# Patient Record
Sex: FEMALE | Race: WHITE | NOT HISPANIC OR LATINO | Employment: FULL TIME | URBAN - NONMETROPOLITAN AREA
[De-identification: names, ages, dates, MRNs, and addresses within clinical notes are randomized per-mention and may not be internally consistent; named-entity substitution may affect disease eponyms.]

---

## 2023-09-21 ENCOUNTER — HOSPITAL ENCOUNTER (EMERGENCY)
Facility: HOSPITAL | Age: 38
Discharge: HOME | End: 2023-09-21
Attending: FAMILY MEDICINE | Admitting: FAMILY MEDICINE

## 2023-09-21 VITALS
OXYGEN SATURATION: 99 % | SYSTOLIC BLOOD PRESSURE: 128 MMHG | DIASTOLIC BLOOD PRESSURE: 75 MMHG | BODY MASS INDEX: 34.15 KG/M2 | HEIGHT: 64 IN | RESPIRATION RATE: 18 BRPM | TEMPERATURE: 98.5 F | HEART RATE: 108 BPM | WEIGHT: 200 LBS

## 2023-09-21 DIAGNOSIS — F10.920 ALCOHOLIC INTOXICATION WITHOUT COMPLICATION: Primary | ICD-10-CM

## 2023-09-21 DIAGNOSIS — D50.8 OTHER IRON DEFICIENCY ANEMIA: ICD-10-CM

## 2023-09-21 LAB
ALBUMIN SERPL-MCNC: 3.9 G/DL (ref 3.5–5.2)
ALBUMIN/GLOB SERPL: 1.2 G/DL
ALP SERPL-CCNC: 77 U/L (ref 39–117)
ALT SERPL W P-5'-P-CCNC: 7 U/L (ref 1–33)
AMPHET+METHAMPHET UR QL: NEGATIVE
AMPHETAMINES UR QL: NEGATIVE
ANION GAP SERPL CALCULATED.3IONS-SCNC: 12 MMOL/L (ref 5–15)
ANISOCYTOSIS BLD QL: ABNORMAL
APAP SERPL-MCNC: <5 MCG/ML (ref 0–30)
AST SERPL-CCNC: 20 U/L (ref 1–32)
B-HCG UR QL: NEGATIVE
BARBITURATES UR QL SCN: NEGATIVE
BENZODIAZ UR QL SCN: NEGATIVE
BILIRUB SERPL-MCNC: <0.2 MG/DL (ref 0–1.2)
BUN SERPL-MCNC: 8 MG/DL (ref 6–20)
BUN/CREAT SERPL: 14.5 (ref 7–25)
BUPRENORPHINE SERPL-MCNC: NEGATIVE NG/ML
CALCIUM SPEC-SCNC: 8.6 MG/DL (ref 8.6–10.5)
CANNABINOIDS SERPL QL: NEGATIVE
CHLORIDE SERPL-SCNC: 109 MMOL/L (ref 98–107)
CO2 SERPL-SCNC: 23 MMOL/L (ref 22–29)
COCAINE UR QL: NEGATIVE
CREAT SERPL-MCNC: 0.55 MG/DL (ref 0.57–1)
DEPRECATED RDW RBC AUTO: 46.1 FL (ref 37–54)
EGFRCR SERPLBLD CKD-EPI 2021: 119 ML/MIN/1.73
ERYTHROCYTE [DISTWIDTH] IN BLOOD BY AUTOMATED COUNT: 16.4 % (ref 12.3–15.4)
ETHANOL BLD-MCNC: 205 MG/DL (ref 0–10)
ETHANOL BLD-MCNC: 208 MG/DL (ref 0–10)
ETHANOL UR QL: 0.2 %
ETHANOL UR QL: 0.21 %
FENTANYL UR-MCNC: NEGATIVE NG/ML
GLOBULIN UR ELPH-MCNC: 3.2 GM/DL
GLUCOSE SERPL-MCNC: 95 MG/DL (ref 65–99)
HCT VFR BLD AUTO: 26.7 % (ref 34–46.6)
HGB BLD-MCNC: 7.9 G/DL (ref 12–15.9)
HOLD SPECIMEN: NORMAL
HOLD SPECIMEN: NORMAL
HYPOCHROMIA BLD QL: ABNORMAL
LYMPHOCYTES # BLD MANUAL: 1.45 10*3/MM3 (ref 0.7–3.1)
LYMPHOCYTES NFR BLD MANUAL: 5 % (ref 5–12)
MCH RBC QN AUTO: 23.4 PG (ref 26.6–33)
MCHC RBC AUTO-ENTMCNC: 29.6 G/DL (ref 31.5–35.7)
MCV RBC AUTO: 79 FL (ref 79–97)
METHADONE UR QL SCN: NEGATIVE
MONOCYTES # BLD: 0.43 10*3/MM3 (ref 0.1–0.9)
NEUTROPHILS # BLD AUTO: 6.66 10*3/MM3 (ref 1.7–7)
NEUTROPHILS NFR BLD MANUAL: 78 % (ref 42.7–76)
OPIATES UR QL: NEGATIVE
OXYCODONE UR QL SCN: NEGATIVE
PCP UR QL SCN: NEGATIVE
PLATELET # BLD AUTO: 672 10*3/MM3 (ref 140–450)
PMV BLD AUTO: 8.6 FL (ref 6–12)
POTASSIUM SERPL-SCNC: 3.7 MMOL/L (ref 3.5–5.2)
PROPOXYPH UR QL: NEGATIVE
PROT SERPL-MCNC: 7.1 G/DL (ref 6–8.5)
RBC # BLD AUTO: 3.38 10*6/MM3 (ref 3.77–5.28)
SALICYLATES SERPL-MCNC: 1.8 MG/DL
SMALL PLATELETS BLD QL SMEAR: ABNORMAL
SODIUM SERPL-SCNC: 144 MMOL/L (ref 136–145)
TRICYCLICS UR QL SCN: NEGATIVE
VARIANT LYMPHS NFR BLD MANUAL: 17 % (ref 19.6–45.3)
WBC MORPH BLD: NORMAL
WBC NRBC COR # BLD: 8.54 10*3/MM3 (ref 3.4–10.8)
WHOLE BLOOD HOLD COAG: NORMAL
WHOLE BLOOD HOLD SPECIMEN: NORMAL

## 2023-09-21 PROCEDURE — 80307 DRUG TEST PRSMV CHEM ANLYZR: CPT | Performed by: FAMILY MEDICINE

## 2023-09-21 PROCEDURE — 96365 THER/PROPH/DIAG IV INF INIT: CPT

## 2023-09-21 PROCEDURE — 25010000002 THIAMINE PER 100 MG: Performed by: FAMILY MEDICINE

## 2023-09-21 PROCEDURE — 85025 COMPLETE CBC W/AUTO DIFF WBC: CPT | Performed by: FAMILY MEDICINE

## 2023-09-21 PROCEDURE — 99283 EMERGENCY DEPT VISIT LOW MDM: CPT

## 2023-09-21 PROCEDURE — 85007 BL SMEAR W/DIFF WBC COUNT: CPT | Performed by: FAMILY MEDICINE

## 2023-09-21 PROCEDURE — 80053 COMPREHEN METABOLIC PANEL: CPT | Performed by: FAMILY MEDICINE

## 2023-09-21 PROCEDURE — 96367 TX/PROPH/DG ADDL SEQ IV INF: CPT

## 2023-09-21 PROCEDURE — 82077 ASSAY SPEC XCP UR&BREATH IA: CPT | Performed by: FAMILY MEDICINE

## 2023-09-21 PROCEDURE — 80143 DRUG ASSAY ACETAMINOPHEN: CPT | Performed by: FAMILY MEDICINE

## 2023-09-21 PROCEDURE — 81025 URINE PREGNANCY TEST: CPT | Performed by: FAMILY MEDICINE

## 2023-09-21 PROCEDURE — 80179 DRUG ASSAY SALICYLATE: CPT | Performed by: FAMILY MEDICINE

## 2023-09-21 RX ORDER — SODIUM CHLORIDE 0.9 % (FLUSH) 0.9 %
10 SYRINGE (ML) INJECTION AS NEEDED
Status: DISCONTINUED | OUTPATIENT
Start: 2023-09-21 | End: 2023-09-22 | Stop reason: HOSPADM

## 2023-09-21 RX ORDER — SODIUM CHLORIDE 9 MG/ML
1000 INJECTION, SOLUTION INTRAVENOUS ONCE
Status: COMPLETED | OUTPATIENT
Start: 2023-09-21 | End: 2023-09-21

## 2023-09-21 RX ADMIN — FOLIC ACID 1 MG: 5 INJECTION, SOLUTION INTRAMUSCULAR; INTRAVENOUS; SUBCUTANEOUS at 19:50

## 2023-09-21 RX ADMIN — SODIUM CHLORIDE 1000 ML: 9 INJECTION, SOLUTION INTRAVENOUS at 19:49

## 2023-09-21 RX ADMIN — SODIUM CHLORIDE 1000 ML: 9 INJECTION, SOLUTION INTRAVENOUS at 20:52

## 2023-09-21 RX ADMIN — THIAMINE HYDROCHLORIDE 500 MG: 100 INJECTION, SOLUTION INTRAMUSCULAR; INTRAVENOUS at 20:24

## 2023-09-21 NOTE — ED PROVIDER NOTES
Subjective   History of Present Illness  Patient is a 38 years with past medical history of anxiety and depression who presented here today because of alcohol intoxication.  Patient brought here by EMS.  Per EMS police was called and gave patient option to go to half-way or go to ED for addiction.  Patient said that he had fifth of vodka today.      Review of Systems   All other systems reviewed and are negative.    Past Medical History:   Diagnosis Date    Anemia     Anxiety     Asthma     Hypothyroid        Allergies   Allergen Reactions    Shellfish Allergy Unknown - Low Severity     Had allergy testing done and tested positive for allergy to Shellfish. Has eaten shellfish in the past without any problems.        Past Surgical History:   Procedure Laterality Date    GASTRIC BYPASS  2015    US GUIDED FINE NEEDLE ASPIRATION  7/16/2018    US GUIDED FINE NEEDLE ASPIRATION  7/16/2018       Family History   Problem Relation Age of Onset    Leukemia Maternal Uncle        Social History     Socioeconomic History    Marital status:    Tobacco Use    Smoking status: Never           Objective   Physical Exam  Vitals and nursing note reviewed.   Constitutional:       Appearance: Normal appearance. She is obese.   HENT:      Head: Normocephalic and atraumatic.      Right Ear: Tympanic membrane, ear canal and external ear normal.      Left Ear: Tympanic membrane, ear canal and external ear normal.      Nose: Nose normal.      Mouth/Throat:      Mouth: Mucous membranes are moist.   Eyes:      Extraocular Movements: Extraocular movements intact.      Conjunctiva/sclera: Conjunctivae normal.      Pupils: Pupils are equal, round, and reactive to light.   Cardiovascular:      Rate and Rhythm: Normal rate and regular rhythm.      Pulses: Normal pulses.      Heart sounds: Normal heart sounds.   Pulmonary:      Effort: Pulmonary effort is normal.      Breath sounds: Normal breath sounds.   Abdominal:      General: Abdomen is flat.  Bowel sounds are normal.      Palpations: Abdomen is soft.   Musculoskeletal:         General: Normal range of motion.      Cervical back: Normal range of motion and neck supple.   Skin:     General: Skin is warm.      Capillary Refill: Capillary refill takes less than 2 seconds.   Neurological:      General: No focal deficit present.      Mental Status: She is alert and oriented to person, place, and time.   Psychiatric:         Mood and Affect: Mood normal.         Behavior: Behavior normal.       Procedures           ED Course           Labs Reviewed   COMPREHENSIVE METABOLIC PANEL - Abnormal; Notable for the following components:       Result Value    Creatinine 0.55 (*)     Chloride 109 (*)     All other components within normal limits    Narrative:     GFR Normal >60  Chronic Kidney Disease <60  Kidney Failure <15     ETHANOL - Abnormal; Notable for the following components:    Ethanol 205 (*)     All other components within normal limits   CBC WITH AUTO DIFFERENTIAL - Abnormal; Notable for the following components:    RBC 3.38 (*)     Hemoglobin 7.9 (*)     Hematocrit 26.7 (*)     MCH 23.4 (*)     MCHC 29.6 (*)     RDW 16.4 (*)     Platelets 672 (*)     All other components within normal limits   MANUAL DIFFERENTIAL - Abnormal; Notable for the following components:    Neutrophil % 78.0 (*)     Lymphocyte % 17.0 (*)     All other components within normal limits   ACETAMINOPHEN LEVEL - Normal   SALICYLATE LEVEL - Normal   URINE DRUG SCREEN - Normal    Narrative:     Cutoff For Drugs Screened:    Amphetamines               500 ng/ml  Barbiturates               200 ng/ml  Benzodiazepines            150 ng/ml  Cocaine                    150 ng/ml  Methadone                  200 ng/ml  Opiates                    100 ng/ml  Phencyclidine               25 ng/ml  THC                            50 ng/ml  Methamphetamine            500 ng/ml  Tricyclic Antidepressants  300 ng/ml  Oxycodone                  100  ng/ml  Propoxyphene               300 ng/ml  Buprenorphine               10 ng/ml    The normal value for all drugs tested is negative. This report includes unconfirmed screening results, with the cutoff values listed, to be used for medical treatment purposes only.  Unconfirmed results must not be used for non-medical purposes such as employment or legal testing.  Clinical consideration should be applied to any drug of abuse test, particularly when unconfirmed results are used.     PREGNANCY, URINE - Normal   FENTANYL, URINE - Normal    Narrative:     Negative Threshold:      Fentanyl 5 ng/mL     The normal value for the drug tested is negative. This report includes final unconfirmed screening results to be used for medical treatment purposes only. Unconfirmed results must not be used for non-medical purposes such as employment or legal testing. Clinical consideration should be applied to any drug of abuse test, particularly when unconfirmed results are used.          RAINBOW DRAW    Narrative:     The following orders were created for panel order [place] Draw.  Procedure                               Abnormality         Status                     ---------                               -----------         ------                     Green Top (Gel)[[id number]]                                  Final result               Lavender Top[[id number]]                                     Final result               Gold Top - SST[[id number]]                                   Final result               Light Blue Top[[id number]]                                   Final result                 Please view results for these tests on the individual orders.   POCT GLUCOSE FINGERSTICK   CBC AND DIFFERENTIAL    Narrative:     The following orders were created for panel order CBC & Differential.  Procedure                               Abnormality         Status                     ---------                               -----------          ------                     CBC Auto Differential[[id number]]        Abnormal            Final result                 Please view results for these tests on the individual orders.   GREEN TOP   LAVENDER TOP   GOLD TOP - SST   LIGHT BLUE TOP       No orders to display                                      Medical Decision Making  Patient is a 38 years old with past medical history anxiety and depression who presented here because of alcohol intoxication.  Patient was given a liter of fluid and banana bag.  Patient feeling much better.  Patient was discharged home to follow with the primary care doctor.  Patient hemoglobin is 7.9.  Patient has a history of anemia.    Amount and/or Complexity of Data Reviewed  Labs: ordered.    Risk  Prescription drug management.        Final diagnoses:   Alcoholic intoxication without complication   Other iron deficiency anemia       ED Disposition  ED Disposition       ED Disposition   Discharge    Condition   Stable    Comment   --               No follow-up provider specified.       Medication List      No changes were made to your prescriptions during this visit.            Willis Arreola MD  09/21/23 [id number]

## 2023-09-21 NOTE — ED NOTES
Pt arrives via EMS from home with c/o alcohol intoxication. Pt was at parents house drinking and they got into a argument. Per EMS police was called and option to go to half-way or go to ED for addiction. Pt states she only drinks occasionally.

## 2023-09-22 NOTE — ED NOTES
Pt now stating she does not have anyone to come pick her up so she does not have a choice but to stay.

## 2025-06-02 NOTE — DISCHARGE INSTRUCTIONS
ENDOCRINE MEDICAL ASSISTANT ROOMING NOTE  Is the patient here for diabetes mellitus: NO     Rooming documentation of social history includes tobacco screening only.  Medication list reviewed and updated.    PCP: Sneha Loco, DO    Patient would like communication of their results via:   Cell Phone:   Telephone Information:   Mobile [phone]     Okay to leave a message containing results? Yes    Refills Needed: Yes - prescriptions for medications/supplies needed have been pended for provider review    Results were discussed with patient.  Patient advised to follow-up with primary care in 3 days.  Come back to the ER symptoms get worse.

## 2025-06-10 ENCOUNTER — OFFICE VISIT (OUTPATIENT)
Dept: OTOLARYNGOLOGY | Facility: CLINIC | Age: 40
End: 2025-06-10
Payer: COMMERCIAL

## 2025-06-10 VITALS
BODY MASS INDEX: 34.15 KG/M2 | TEMPERATURE: 98 F | WEIGHT: 200 LBS | DIASTOLIC BLOOD PRESSURE: 87 MMHG | RESPIRATION RATE: 20 BRPM | HEIGHT: 64 IN | HEART RATE: 97 BPM | SYSTOLIC BLOOD PRESSURE: 126 MMHG

## 2025-06-10 DIAGNOSIS — E04.2 NONTOXIC MULTINODULAR GOITER: Primary | ICD-10-CM

## 2025-06-10 RX ORDER — FERROUS SULFATE 324(65)MG
324 TABLET, DELAYED RELEASE (ENTERIC COATED) ORAL
COMMUNITY
Start: 2025-05-02 | End: 2025-10-30

## 2025-06-10 RX ORDER — ESCITALOPRAM OXALATE 20 MG/1
20 TABLET ORAL DAILY
COMMUNITY

## 2025-06-10 RX ORDER — FOLIC ACID 20 MG
20 CAPSULE ORAL DAILY
COMMUNITY
Start: 2025-05-08

## 2025-06-10 NOTE — PROGRESS NOTES
REAGAN Harris  [facility]  [address], [address]  [place] [address]  Fax [phone]  Phone [phone]      Visit Type: NEW PATIENT   Chief Complaint   Patient presents with    multinodular goiter     Nontoxic    Difficulty Swallowing           HPI      History of Present Illness  The patient is a 40-year-old female who presents as a new patient with thyroid complaints.    She was admitted to the hospital, and an incidental finding of multiple thyroid nodules was noted on a thyroid ultrasound. The right thyroid lobe measures 5.9 cm, and the left thyroid lobe measures 7.1 cm. She has two nodules on the right thyroid measuring 2.3 and 4 cm, both TI-RADS 4, with biopsy recommended. On the left side, she has two nodules measuring 3.6 and 1.7 cm, both TI-RADS 4, with biopsy recommended. She had a swallow study during that admission, which was normal.    She underwent bilateral FNAs at [facility] [place] in 07/2018, which were benign bilaterally. A thyroid ultrasound in 2018 showed the right lobe at 5.6 cm and the left upper lobe at 6.5 cm, with multiple thyroid nodules present at that time. The largest nodule in the left lobe was 3.8 cm and was a TI-RADS 4, which has been previously biopsied. She also had a 2.2 cm nodule in the left lobe. The largest nodule in the right lobe was 3.3 cm. Upon reviewing historical imaging and biopsies, these appear to be stable.    She reports a long-standing history of thyroid nodules, which have progressively enlarged over time. She does not report any current acid reflux but notes that her severe pre-gastric bypass acid reflux was significantly alleviated post-surgery. She reports no known family history of thyroid cancer or any history of radiation exposure beyond standard x-rays. She consumes high amounts of caffeine, specifically Coke Zero, throughout the day.    She  experiences difficulty swallowing, particularly with hard-to-chew foods or large bites, which often become lodged in her throat, necessitating regurgitation. This issue occurs sporadically, approximately a few times per week, and was not present prior to her gastric surgery 10 years ago. Her water intake is limited to one or two bottles per day.    PAST SURGICAL HISTORY:  - Gastric bypass surgery, 10 years ago  - Bilateral FNAs, 07/2018    FAMILY HISTORY  She reports no known family history of thyroid cancer.    Results  Imaging   - Thyroid ultrasound: Right thyroid lobe measures 5.9 cm, left thyroid lobe measures 7.1 cm. Two nodules on the right thyroid measuring 2.3 and 4 cm, both TI-RADS four. Two nodules on the left side, 3.6 and 1.7, both TI-RADS four.    Diagnostic Testing   - Swallow study: Normal    Past Medical History:   Diagnosis Date    Anemia     Anxiety     Asthma     Hypothyroid        Past Surgical History:   Procedure Laterality Date    GASTRIC BYPASS  2015    US GUIDED FINE NEEDLE ASPIRATION  7/16/2018    US GUIDED FINE NEEDLE ASPIRATION  7/16/2018       Family History: Her family history includes Leukemia in her maternal uncle.     Social History: She  reports that she has never smoked. She has never used smokeless tobacco. She reports that she does not currently use alcohol. She reports that she does not use drugs.    Home Medications:  Cariprazine HCl, Cyanocobalamin, Folic Acid, atomoxetine, busPIRone, desvenlafaxine, escitalopram, ferrous sulfate, hydrOXYzine pamoate, iron polysaccharides, sertraline, and vitamin D    Allergies:  She is allergic to trintellix [vortioxetine] and shellfish allergy.       Vital Signs:   Temp:  [98 °F (36.7 °C)] 98 °F (36.7 °C)  Heart Rate:  [97] 97  Resp:  [20] 20  BP: (126)/(87) 126/87  ENT Physical Exam  Neck  Neck: neck normal;  Thyroid: nodules and enlargement present on bilateral lobes;       Physical Exam  Neck: Thyromegaly noted with multiple thyroid  nodules bilaterally.        Result Review       RESULTS REVIEW    I have reviewed the patients old records in the chart.   The following results/records were reviewed:   FL Video Swallow With Speech Single Contrast (05/16/2025 09:32 EDT) hx gastric bypass surgery, normal swallow study  US THYROID (05/16/2025 11:37 EDT) thyromegaly, multiple TR4 nodules of sufficient size to biopsy       Assessment & Plan  Nontoxic multinodular goiter       Assessment & Plan  1. Multinodular goiter.  The patient has a history of multiple thyroid nodules, with recent imaging showing stability in size. The left lobe of the thyroid has increased by half a centimeter over the past seven years, while the nodules have either remained the same size or slightly decreased. All nodules are classified as TI-RADS four. Given the stability and benign nature of previous biopsies, the likelihood of malignancy is low. Increasing water intake to at least 60 ounces daily is recommended to address potential dehydration contributing to dysphagia. If there is no change in the size of the nodules in eight months, a biopsy will not be necessary. If symptoms persist or worsen, a biopsy may be considered.    2. Dysphagia.  The patient reports difficulty swallowing, particularly with hard-to-chew foods, which may be related to dehydration and a history of gastric bypass surgery. Increasing water intake to at least 60 ounces daily is recommended to alleviate symptoms. If symptoms persist despite increased hydration, further evaluation will be necessary.    Risks, benefits, and alternatives of treatment were discussed in detail. The patient was informed that the nodules are stable and the likelihood of malignancy is low. Increasing water intake may alleviate symptoms, and if there is no change in the size of the nodules in eight months, a biopsy will not be necessary. Surgery is an option if symptoms persist, but it would involve removing the entire thyroid,  requiring lifelong daily medication. The patient prefers to try increasing water intake first and will follow up in eight months with an ultrasound. No family history of thyroid cancer or radiation exposure was reported.    Follow-up  Follow up in 8 months with an ultrasound.     Orders Placed This Encounter   Procedures    US Thyroid           Return in about 8 months (around 2/10/2026) for Follow up with REAGAN Harris, with ultrasound.        Electronically signed by REAGAN Harris 06/10/25 10:32 AM CDT.     Patient or patient representative verbalized consent for the use of Ambient Listening during the visit with  REAGAN Harris for chart documentation. 6/10/2025  10:32 CDT